# Patient Record
Sex: FEMALE | Race: WHITE
[De-identification: names, ages, dates, MRNs, and addresses within clinical notes are randomized per-mention and may not be internally consistent; named-entity substitution may affect disease eponyms.]

---

## 2021-05-23 ENCOUNTER — HOSPITAL ENCOUNTER (EMERGENCY)
Dept: HOSPITAL 61 - ER | Age: 46
Discharge: HOME | End: 2021-05-23
Payer: COMMERCIAL

## 2021-05-23 VITALS — HEIGHT: 65 IN | WEIGHT: 154.32 LBS | BODY MASS INDEX: 25.71 KG/M2

## 2021-05-23 VITALS — DIASTOLIC BLOOD PRESSURE: 70 MMHG | SYSTOLIC BLOOD PRESSURE: 133 MMHG

## 2021-05-23 DIAGNOSIS — S90.32XA: ICD-10-CM

## 2021-05-23 DIAGNOSIS — Z88.1: ICD-10-CM
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
## 2021-05-23 DIAGNOSIS — S90.02XA: Primary | ICD-10-CM
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
## 2021-05-23 DIAGNOSIS — F17.200: ICD-10-CM
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
## 2021-05-23 PROCEDURE — 73610 X-RAY EXAM OF ANKLE: CPT

## 2021-05-23 PROCEDURE — 99283 EMERGENCY DEPT VISIT LOW MDM: CPT

## 2021-05-23 NOTE — RAD
Left ankle x-rays 3 views



HISTORY: Left ankle swelling and pain after a fall.



FINDINGS: Mild lateral ankle soft tissue swelling. No fracture. No dislocation. No talus osteochondra
l lesion.



IMPRESSION: No acute osseous injury. Lateral ankle soft tissue swelling.



Electronically signed by: Jeffery Hudson MD (5/23/2021 9:18 PM) [facility]

## 2021-05-23 NOTE — PHYS DOC
Past Medical History


Past Medical History:  No Pertinent History


Additional Past Medical Histor:  fine tremor


 (GUANAKITO DELEON APRN)


Past Surgical History:  Other


Additional Past Surgical Histo:  Tummy tuck and breast aumentation


 (GUANAKITO DELEON APRN)


Smoking Status:  Current Every Day Smoker


Alcohol Use:  None


 (GUANAKITO DELEON)





General Adult


EDM:


Chief Complaint:  FOOT INJURY PAIN





HPI:


HPI:





Patient is a 45  year old female who presents with was walking outside when she 

somehow slipped and fell rolling her left ankle.  She has bruising and 2+ 

swelling to the lateral ankle onto the dorsal foot on the lateral side.  Patient

does have some range of motion to that ankle.  It is painful to put pressure on.

 She denies numbness or tingling.  Patient can wiggle her toes.  She has a 

history of tummy tuck, breast augmentation, fine tremor and smoker.


 (GUANAKITO DELEON APRN)





Review of Systems:


Review of Systems:


Constitutional:   Denies fever or chills. []


Eyes:   Denies change in visual acuity. []


HENT:   Denies nasal congestion or sore throat. [] 


Respiratory:   Denies cough or shortness of breath. [] 


Cardiovascular:   Denies chest pain or + left ankle edema. [] 


GI:   Denies abdominal pain, nausea, vomiting, bloody stools or diarrhea. [] 


:  Denies dysuria. [] 


Musculoskeletal:   Denies back pain. + Left ankle joint pain. + Left foot [] 


Integument:   Denies rash. + Bruising to the left ankle and foot [] 


Neurologic:   Denies headache, focal weakness or sensory changes. [] 


Endocrine:   Denies polyuria or polydipsia. [] 


Lymphatic:  Denies swollen glands. [] 


Psychiatric:  Denies depression or anxiety. []


 (GUANAKITO DELEON APRN)





Heart Score:


C/O Chest Pain:  No


Risk Factors:


Risk Factors:  DM, Current or recent (<one month) smoker, HTN, HLP, family 

history of CAD, obesity.


Risk Scores:


Score 0 - 3:  2.5% MACE over next 6 weeks - Discharge Home


Score 4 - 6:  20.3% MACE over next 6 weeks - Admit for Clinical Observation


Score 7 - 10:  72.7% MACE over next 6 weeks - Early Invasive Strategies


 (GUANAKITO DELEON)





Current Medications:





Current Medications








 Medications


  (Trade)  Dose


 Ordered  Sig/Landen  Start Time


 Stop Time Status Last Admin


Dose Admin


 


 Acetaminophen/


 Hydrocodone Bitart


  (Lortab 5/325)  1 tab  1X  ONCE  21 21:30


 21 21:31  21 21:08


1 TAB








 (GUANAKITO DELEON)





Allergies:


Allergies:





Allergies








Coded Allergies Type Severity Reaction Last Updated Verified


 


  amoxicillin Adverse Reaction Mild Upset stomach 21 Yes








 (GUANAKITO DELEON)





Physical Exam:


PE:





Constitutional: Well developed, well nourished, no acute distress, non-toxic georgie

earance. []


HENT: Normocephalic, atraumatic, bilateral external ears normal, oropharynx 

moist, no oral exudates, nose normal. []


Eyes: PERRLA, EOMI, conjunctiva normal, no discharge. [] 


Neck: Normal range of motion, no tenderness, supple, no stridor. [] 


Cardiovascular:Heart rate regular rhythm, no murmur []


Lungs & Thorax:  Bilateral breath sounds clear to auscultation []


Abdomen: Bowel sounds normal, soft, no tenderness, no masses, no pulsatile 

masses. [] 


Skin: Warm, dry, no erythema, no rash.  Left lateral ankle and foot bruising [] 


Back: No tenderness, no CVA tenderness. [] 


Extremities: Left lateral ankle and foot tenderness, no cyanosis, no clubbing, 

left ankle ROM intact but limited due to pain, left ankle 2+ edema. [] 


Neurologic: Alert and oriented X 3, normal motor function, normal sensory 

function, no focal deficits noted. []


Psychologic: Affect normal, judgement normal, mood normal. []


 (GUANAKITO DELEON)





Current Patient Data:


Vital Signs:





                                   Vital Signs








  Date Time  Temp Pulse Resp B/P (MAP) Pulse Ox O2 Delivery O2 Flow Rate FiO2


 


21 21:08   18  100   


 


21 20:56 98.3 68  137/76 (96)  Room Air  





 98.3       








 (GUANAKITO DELEON)





EKG:


EKG:


[]


 (GUANAKITO DELEON)





Radiology/Procedures:


Radiology/Procedures:


[]


Impression:


                            [facility]


                    [address] [place] [address]


                                 [phone]


                                        


                                 IMAGING REPORT





                                     Signed





PATIENT: COLLIN GOODE       ACCOUNT: [id number]     MRN#: [id number]


: 1975           LOCATION: ER              AGE: 45


SEX: F                    EXAM DT: 21         ACCESSION#: [id number]


STATUS: PRE ER            ORD. PHYSICIAN: GUANAKITO DELEON


REASON: swelling, fall, tenderness


PROCEDURE: ANKLE LEFT 3V





Left ankle x-rays 3 views





HISTORY: Left ankle swelling and pain after a fall.





FINDINGS: Mild lateral ankle soft tissue swelling. No fracture. No dislocation. 

No talus osteochondral lesion.





IMPRESSION: No acute osseous injury. Lateral ankle soft tissue swelling.





Electronically signed by: Emi Hudson MD (2021 9:18 PM) [facility]














DICTATED and SIGNED BY:     EMI HUDSON MD


DATE:     217MTH0 0


 (GUANAKITO DELEON)





Course & Med Decision Making:


Course & Med Decision Making


Pertinent Labs and Imaging studies reviewed. (See chart for details)





See HPI.  Alert and oriented x4.  Ambulatory with but limping on the left 

extremity.  Speaks in full clear sentences.  Pedal pulse strong present.  Cap 

refills less than 2 seconds.  Skin pink warm and dry.  Bruising present to the 

lateral ankle and foot.  2+ swelling.  She can wiggle her toes.





X-rays show no acute findings.  Patient is placed in a walking boot.





[]


 (GUANAKITO DELEON)


Course & Med Decision Making


 The chart was reviewed. MLP assessed and treated patient independently  I was 

available for consult.. Agree with the plan of care.


 (EVELINA BUSTOS DO)


Dragon Disclaimer:


Dragon Disclaimer:


This electronic medical record was generated, in whole or in part, using a voice

recognition dictation system.


 (GUANAKITO DELEON)





Departure


Departure


Impression:  


   Primary Impression:  


   Left ankle injury


   Qualified Codes:  S99.912A - Unspecified injury of left ankle, initial 

   encounter


Disposition:   HOME / SELF CARE / HOMELESS


Condition:  STABLE


Patient Instructions:  Ankle Sprain





Additional Instructions:  


Follow-up with your primary care doctor or go to an orthopedic.  I have referred

you to a orthopedic doctor that you can follow-up with.  Use ice and elevation. 

Take pain medication as prescribed and with food.


Scripts


Ibuprofen (IBUPROFEN) 600 Mg Tablet


600 MG PO PRN Q6HRS PRN for INFLAMMATION, #25 TAB


   Prov: GUANAKITO DELEON         21 


Hydrocodone Bit/Acetaminophen (HYDROCODONE-APAP 5-325  **) 1 Tab Tablet


1 TAB PO PRN Q6HRS PRN for PAIN, #12 TAB 0 Refills


   Prov: GUANAKITO DELEON         21











GUANAKITO DELEON            May 23, 2021 21:25


EVELINA BUSTOS DO            May 26, 2021 06:04